# Patient Record
Sex: MALE | Race: WHITE | NOT HISPANIC OR LATINO | Employment: FULL TIME | URBAN - METROPOLITAN AREA
[De-identification: names, ages, dates, MRNs, and addresses within clinical notes are randomized per-mention and may not be internally consistent; named-entity substitution may affect disease eponyms.]

---

## 2018-01-25 ENCOUNTER — OFFICE VISIT (OUTPATIENT)
Dept: UROLOGY | Facility: MEDICAL CENTER | Age: 78
End: 2018-01-25
Payer: MEDICARE

## 2018-01-25 VITALS
WEIGHT: 250 LBS | DIASTOLIC BLOOD PRESSURE: 84 MMHG | HEIGHT: 70 IN | BODY MASS INDEX: 35.79 KG/M2 | SYSTOLIC BLOOD PRESSURE: 142 MMHG

## 2018-01-25 DIAGNOSIS — N52.35 ERECTILE DYSFUNCTION FOLLOWING RADIATION THERAPY: ICD-10-CM

## 2018-01-25 DIAGNOSIS — Z92.3 PERSONAL HISTORY OF IRRADIATION, PRESENTING HAZARDS TO HEALTH: ICD-10-CM

## 2018-01-25 DIAGNOSIS — C61 PROSTATE CANCER (HCC): Primary | ICD-10-CM

## 2018-01-25 DIAGNOSIS — R35.1 NOCTURIA: ICD-10-CM

## 2018-01-25 LAB
PROT UR QL STRIP: NORMAL
SCAN RESULT: 44
SL AMB  POCT GLUCOSE, UA: NORMAL
SL AMB LEUKOCYTE ESTERASE,UA: NORMAL
SL AMB POCT BILIRUBIN,UA: NORMAL
SL AMB POCT BLOOD,UA: NORMAL
SL AMB POCT CLARITY,UA: CLEAR
SL AMB POCT COLOR,UA: NORMAL
SL AMB POCT KETONES,UA: NORMAL
SL AMB POCT NITRITE,UA: NORMAL
SL AMB POCT PH,UA: 5.5
SL AMB POCT SPECIFIC GRAVITY,UA: 1.01
UROBILINOGEN UR STRIP-ACNC: 0.2

## 2018-01-25 PROCEDURE — 81003 URINALYSIS AUTO W/O SCOPE: CPT | Performed by: UROLOGY

## 2018-01-25 PROCEDURE — 51798 US URINE CAPACITY MEASURE: CPT | Performed by: UROLOGY

## 2018-01-25 PROCEDURE — 99214 OFFICE O/P EST MOD 30 MIN: CPT | Performed by: UROLOGY

## 2018-01-25 RX ORDER — EZETIMIBE 10 MG/1
TABLET ORAL
COMMUNITY
Start: 2017-12-21 | End: 2022-03-31

## 2018-01-25 RX ORDER — AMOXICILLIN 500 MG/1
500 CAPSULE ORAL
COMMUNITY
End: 2019-01-31 | Stop reason: ALTCHOICE

## 2018-01-25 RX ORDER — OMEPRAZOLE 10 MG/1
10 CAPSULE, DELAYED RELEASE ORAL
COMMUNITY

## 2018-01-25 RX ORDER — VALSARTAN AND HYDROCHLOROTHIAZIDE 320; 25 MG/1; MG/1
TABLET, FILM COATED ORAL
COMMUNITY
Start: 2017-12-21 | End: 2022-03-31

## 2018-01-25 RX ORDER — ALPRAZOLAM 0.5 MG/1
TABLET ORAL
COMMUNITY
Start: 2017-11-16

## 2018-01-25 RX ORDER — CHLORAL HYDRATE 500 MG
2000 CAPSULE ORAL DAILY
COMMUNITY
End: 2022-04-07 | Stop reason: HOSPADM

## 2018-01-25 RX ORDER — ACETAMINOPHEN 325 MG/1
650 TABLET ORAL
COMMUNITY

## 2018-01-25 RX ORDER — GABAPENTIN 300 MG/1
CAPSULE ORAL
Refills: 0 | COMMUNITY
Start: 2018-01-23 | End: 2019-01-31 | Stop reason: ALTCHOICE

## 2018-01-25 RX ORDER — DIPHENOXYLATE HYDROCHLORIDE AND ATROPINE SULFATE 2.5; .025 MG/1; MG/1
1 TABLET ORAL
COMMUNITY

## 2018-01-25 NOTE — PROGRESS NOTES
Assessment/Plan:        Diagnoses and all orders for this visit:    Prostate cancer ([facility] [address] )  -     POCT urine dip auto non-scope  -     PSA Total, Diagnostic; Future    Erectile dysfunction following radiation therapy    Personal history of irradiation, presenting hazards to health    Nocturia 4-5 times per night    Other orders  -     ALPRAZolam (XANAX) 0 5 mg tablet;   -     amoxicillin (AMOXIL) 500 mg capsule; Take 500 mg by mouth  -     acetaminophen (TYLENOL) 325 mg tablet; Take 650 mg by mouth  -     Calcium Carb-Ergocalciferol 250-125 MG-UNIT TABS; Take 1 tablet by mouth  -     Cholecalciferol 1000 units tablet; Take 1,000 Units by mouth  -     cyanocobalamin 1000 MCG tablet; Take 1,000 mcg by mouth  -     ezetimibe (ZETIA) 10 mg tablet;   -     gabapentin (NEURONTIN) 300 mg capsule; TK 1 C PO QHS FOR 3 DAYS THEN 1 C PO BID FOR 3 DAYS THEN 1 C PO TID  -     multivitamin (THERAGRAN) TABS; Take 1 tablet by mouth  -     omeprazole (PriLOSEC) 10 mg delayed release capsule; Take 10 mg by mouth  -     valsartan-hydrochlorothiazide (DIOVAN-HCT) 320-25 MG per tablet;   -     Omega-3 Fatty Acids (FISH OIL) 1,000 mg; Take 2,000 mg by mouth daily  -     Calcium Polycarbophil (FIBER-CAPS PO); Take 2 tablets by mouth daily        Subjective:     Patient ID: Osmar Dunlap is a 78 y o  male  HPI    Review of Systems   Constitutional: Negative  HENT: Negative  Eyes: Negative  Respiratory: Negative  Cardiovascular: Positive for leg swelling  Gastrointestinal: Negative  Endocrine: Negative  Genitourinary: Positive for urgency  Musculoskeletal: Positive for back pain  Skin: Negative  Allergic/Immunologic: Negative  Neurological: Negative  Hematological: Negative  Psychiatric/Behavioral: Negative  Objective:     Physical Exam   Constitutional: He is oriented to person, place, and time  Vital signs are normal  He appears well-developed and well-nourished  He is cooperative     HENT: Head: Normocephalic and atraumatic  Eyes: EOM are normal  Pupils are equal, round, and reactive to light  Neck: Normal range of motion  Neck supple  Cardiovascular: Normal rate and regular rhythm  Pulmonary/Chest: Effort normal and breath sounds normal    Abdominal: Soft  Bowel sounds are normal    Genitourinary: Penis normal    Musculoskeletal: Normal range of motion  Neurological: He is alert and oriented to person, place, and time  Skin: Skin is warm and dry  Psychiatric: He has a normal mood and affect   His behavior is normal  Judgment and thought content normal

## 2019-01-24 RX ORDER — AMLODIPINE BESYLATE 5 MG/1
TABLET ORAL
Refills: 3 | COMMUNITY
Start: 2018-12-05 | End: 2019-01-31 | Stop reason: ALTCHOICE

## 2019-01-24 RX ORDER — COLCHICINE 0.6 MG/1
TABLET, FILM COATED ORAL
Refills: 1 | COMMUNITY
Start: 2018-12-05 | End: 2021-02-25 | Stop reason: ALTCHOICE

## 2019-01-31 ENCOUNTER — OFFICE VISIT (OUTPATIENT)
Dept: UROLOGY | Facility: MEDICAL CENTER | Age: 79
End: 2019-01-31
Payer: MEDICARE

## 2019-01-31 VITALS
HEIGHT: 69 IN | BODY MASS INDEX: 37.03 KG/M2 | SYSTOLIC BLOOD PRESSURE: 152 MMHG | HEART RATE: 54 BPM | WEIGHT: 250 LBS | DIASTOLIC BLOOD PRESSURE: 86 MMHG

## 2019-01-31 DIAGNOSIS — Z92.3 PERSONAL HISTORY OF IRRADIATION: ICD-10-CM

## 2019-01-31 DIAGNOSIS — R35.1 NOCTURIA MORE THAN TWICE PER NIGHT: ICD-10-CM

## 2019-01-31 DIAGNOSIS — N52.35 ERECTILE DYSFUNCTION FOLLOWING RADIATION THERAPY: ICD-10-CM

## 2019-01-31 DIAGNOSIS — C61 PROSTATE CANCER (HCC): Primary | ICD-10-CM

## 2019-01-31 LAB
POST-VOID RESIDUAL VOLUME, ML POC: 9 ML
SL AMB  POCT GLUCOSE, UA: NORMAL
SL AMB LEUKOCYTE ESTERASE,UA: NORMAL
SL AMB POCT BILIRUBIN,UA: NORMAL
SL AMB POCT BLOOD,UA: NORMAL
SL AMB POCT CLARITY,UA: CLEAR
SL AMB POCT COLOR,UA: YELLOW
SL AMB POCT KETONES,UA: NORMAL
SL AMB POCT NITRITE,UA: NORMAL
SL AMB POCT PH,UA: 6.5
SL AMB POCT SPECIFIC GRAVITY,UA: 1.01
SL AMB POCT URINE PROTEIN: NORMAL
SL AMB POCT UROBILINOGEN: 0.2

## 2019-01-31 PROCEDURE — 99214 OFFICE O/P EST MOD 30 MIN: CPT | Performed by: UROLOGY

## 2019-01-31 PROCEDURE — 51798 US URINE CAPACITY MEASURE: CPT | Performed by: UROLOGY

## 2019-01-31 PROCEDURE — 81003 URINALYSIS AUTO W/O SCOPE: CPT | Performed by: UROLOGY

## 2019-01-31 RX ORDER — OXYBUTYNIN CHLORIDE 5 MG/1
5 TABLET ORAL
Qty: 30 TABLET | Refills: 11 | Status: SHIPPED | OUTPATIENT
Start: 2019-01-31 | End: 2019-05-27 | Stop reason: SDUPTHER

## 2019-01-31 NOTE — PROGRESS NOTES
Assessment/Plan:      Diagnoses and all orders for this visit:    Prostate cancer ([facility] [address] )  -     POCT urine dip auto non-scope  -     PSA Total, Diagnostic; Future  -     PSA Total, Diagnostic; Future    Personal history of irradiation    Erectile dysfunction following radiation therapy    Nocturia more than twice per night  -     POCT Measure PVR  -     oxybutynin (DITROPAN) 5 mg tablet; Take 1 tablet (5 mg total) by mouth daily at bedtime          Subjective:  No complaints     Patient ID: Raad Cummings is a 70 y o  male  HPI  He is 8 years after completion of his external beam radiation therapy for localized prostate cancer  The patient states he voids well except his major urologic issue is that of nocturia 5-6 times per night  He states he has a good urine stream does not have to strain or bear down to generate it  He denies any hematuria, dysuria, flank or abdominal pains  He states he has a good appetite and normal bowel function  He denies any weight loss or pain in new locations  Review of Systems   Constitutional: Negative  HENT: Negative  Eyes: Negative  Respiratory: Negative  Cardiovascular: Negative  Gastrointestinal: Negative  Endocrine: Negative  Genitourinary: Negative  Musculoskeletal: Negative  Skin: Negative  Allergic/Immunologic: Negative  Neurological: Negative  Hematological: Negative  Psychiatric/Behavioral: Negative  Objective:     Physical Exam   Constitutional: He is oriented to person, place, and time  He appears well-developed and well-nourished  No distress  HENT:   Head: Normocephalic and atraumatic  Nose: Nose normal    Mouth/Throat: Oropharynx is clear and moist    Eyes: Pupils are equal, round, and reactive to light  Conjunctivae and EOM are normal  No scleral icterus  Neck: Normal range of motion  Neck supple  Cardiovascular: Normal rate, regular rhythm, normal heart sounds and intact distal pulses      No murmur heard   Pulmonary/Chest: Effort normal and breath sounds normal  No respiratory distress  He has no wheezes  He has no rales  Abdominal: Soft  Bowel sounds are normal  He exhibits no distension and no mass  There is no tenderness  Musculoskeletal: Normal range of motion  He exhibits no edema or tenderness  Lymphadenopathy:     He has no cervical adenopathy  Neurological: He is alert and oriented to person, place, and time  No cranial nerve deficit  Skin: Skin is warm and dry  No rash noted  No erythema  No pallor  Psychiatric: He has a normal mood and affect  His behavior is normal  Judgment and thought content normal    Nursing note and vitals reviewed        PVR today was 9 cc    PSA from 01/26/2018 was 0 2

## 2019-01-31 NOTE — LETTER
January 31, 2019     Sisi Ledbetter MD  [address] [place] [address]  [facility]    Patient: Pretty Sotelo   YOB: 1940   Date of Visit: 1/31/2019       Dear Dr Cassidy Riley: Thank you for referring Mae Marinaers to me for evaluation  Below are my notes for this consultation  If you have questions, please do not hesitate to call me  I look forward to following your patient along with you           Sincerely,        Candelaria Pack MD        CC: No Recipients

## 2019-05-27 DIAGNOSIS — R35.1 NOCTURIA MORE THAN TWICE PER NIGHT: ICD-10-CM

## 2019-05-28 RX ORDER — OXYBUTYNIN CHLORIDE 5 MG/1
TABLET ORAL
Qty: 90 TABLET | Refills: 8 | Status: SHIPPED | OUTPATIENT
Start: 2019-05-28 | End: 2020-01-19

## 2020-01-19 DIAGNOSIS — R35.1 NOCTURIA MORE THAN TWICE PER NIGHT: ICD-10-CM

## 2020-01-19 RX ORDER — OXYBUTYNIN CHLORIDE 5 MG/1
TABLET ORAL
Qty: 30 TABLET | Refills: 0 | Status: SHIPPED | OUTPATIENT
Start: 2020-01-19 | End: 2020-02-13 | Stop reason: SDUPTHER

## 2020-02-13 ENCOUNTER — OFFICE VISIT (OUTPATIENT)
Dept: UROLOGY | Facility: MEDICAL CENTER | Age: 80
End: 2020-02-13
Payer: MEDICARE

## 2020-02-13 VITALS
SYSTOLIC BLOOD PRESSURE: 148 MMHG | HEIGHT: 69 IN | WEIGHT: 265 LBS | BODY MASS INDEX: 39.25 KG/M2 | DIASTOLIC BLOOD PRESSURE: 78 MMHG

## 2020-02-13 DIAGNOSIS — R35.1 NOCTURIA MORE THAN TWICE PER NIGHT: ICD-10-CM

## 2020-02-13 DIAGNOSIS — Z92.3 HISTORY OF EXTERNAL BEAM RADIATION THERAPY: ICD-10-CM

## 2020-02-13 DIAGNOSIS — C61 PROSTATE CANCER (HCC): Primary | ICD-10-CM

## 2020-02-13 LAB
POST-VOID RESIDUAL VOLUME, ML POC: 7 ML
SL AMB  POCT GLUCOSE, UA: NORMAL
SL AMB LEUKOCYTE ESTERASE,UA: NORMAL
SL AMB POCT BILIRUBIN,UA: NORMAL
SL AMB POCT BLOOD,UA: NORMAL
SL AMB POCT CLARITY,UA: CLEAR
SL AMB POCT COLOR,UA: YELLOW
SL AMB POCT KETONES,UA: NORMAL
SL AMB POCT NITRITE,UA: NORMAL
SL AMB POCT PH,UA: 7
SL AMB POCT SPECIFIC GRAVITY,UA: 1.01
SL AMB POCT URINE PROTEIN: NORMAL
SL AMB POCT UROBILINOGEN: 0.2

## 2020-02-13 PROCEDURE — 81003 URINALYSIS AUTO W/O SCOPE: CPT | Performed by: UROLOGY

## 2020-02-13 PROCEDURE — 99214 OFFICE O/P EST MOD 30 MIN: CPT | Performed by: UROLOGY

## 2020-02-13 PROCEDURE — 51798 US URINE CAPACITY MEASURE: CPT | Performed by: UROLOGY

## 2020-02-13 RX ORDER — GABAPENTIN 300 MG/1
CAPSULE ORAL 3 TIMES DAILY
COMMUNITY
End: 2021-02-25 | Stop reason: ALTCHOICE

## 2020-02-13 RX ORDER — AMLODIPINE BESYLATE 5 MG/1
TABLET ORAL
COMMUNITY
Start: 2019-11-18 | End: 2022-03-31

## 2020-02-13 RX ORDER — OXYBUTYNIN CHLORIDE 5 MG/1
10 TABLET ORAL
Qty: 180 TABLET | Refills: 3 | Status: SHIPPED | OUTPATIENT
Start: 2020-02-13 | End: 2021-02-25

## 2020-02-13 NOTE — PROGRESS NOTES
Assessment/Plan:      Diagnoses and all orders for this visit:    Prostate cancer ([facility] [address] )  -     POCT urine dip auto non-scope  -     POCT Measure PVR  -     PSA Total, Diagnostic; Future    History of external beam radiation therapy  -     PSA Total, Diagnostic; Future    Nocturia more than twice per night  -     oxybutynin (DITROPAN) 5 mg tablet; Take 2 tablets (10 mg total) by mouth daily at bedtime    Other orders  -     amLODIPine (NORVASC) 5 mg tablet; TK 1 T PO QD  -     gabapentin (NEURONTIN) 300 mg capsule; 3 (three) times a day  -     metFORMIN (GLUCOPHAGE) 500 mg tablet; Take 500 mg by mouth        Plan: Will have him take 10 mg nightly of Ditropan, to see if that would reduce his nocturia further  Subjective:  No complaints     Patient ID: Princess Koroma is a 70 y o  male  HPI  He is 9 years after completion of his external beam radiation therapy for localized prostate cancer  The patient states he voids well except his major urologic issue is that of nocturia 5-6 times per night when he was last here we started him on Ditropan 5 mg at bedtime  He states that that has had some affect in reducing his nocturia to now 2-3 times per night  He states he has a good urine stream does not have to strain or bear down to generate it  He denies any hematuria, dysuria, flank or abdominal pains  He states he has a good appetite and normal bowel function  He denies any weight loss or pain in new locations  Review of Systems   Constitutional: Negative  HENT: Negative  Eyes: Negative  Respiratory: Negative  Cardiovascular: Negative  Gastrointestinal: Negative  Endocrine: Negative  Genitourinary: Negative  Musculoskeletal: Negative  Skin: Negative  Allergic/Immunologic: Negative  Neurological: Negative  Hematological: Negative  Psychiatric/Behavioral: Negative  Objective:     Physical Exam   Constitutional: He is oriented to person, place, and time   He appears well-developed and well-nourished  No distress  HENT:   Head: Normocephalic and atraumatic  Nose: Nose normal    Mouth/Throat: Oropharynx is clear and moist    Eyes: Pupils are equal, round, and reactive to light  Conjunctivae and EOM are normal  No scleral icterus  Neck: Normal range of motion  Neck supple  Cardiovascular: Normal rate, regular rhythm, normal heart sounds and intact distal pulses  No murmur heard  Pulmonary/Chest: Effort normal and breath sounds normal  No respiratory distress  He has no wheezes  He has no rales  Abdominal: Soft  Bowel sounds are normal  He exhibits no distension and no mass  There is no tenderness  Musculoskeletal: Normal range of motion  He exhibits no edema or tenderness  Lymphadenopathy:     He has no cervical adenopathy  Neurological: He is alert and oriented to person, place, and time  No cranial nerve deficit  Skin: Skin is warm and dry  No rash noted  No erythema  No pallor  Psychiatric: He has a normal mood and affect  His behavior is normal  Judgment and thought content normal    Nursing note and vitals reviewed        Bladder scan PVR today was 7 cc    PSA from 01/31/2020 was 0 15

## 2021-01-05 DIAGNOSIS — C61 PROSTATE CANCER (HCC): Primary | ICD-10-CM

## 2021-01-11 ENCOUNTER — IMMUNIZATIONS (OUTPATIENT)
Dept: FAMILY MEDICINE CLINIC | Facility: HOSPITAL | Age: 81
End: 2021-01-11

## 2021-01-11 DIAGNOSIS — Z23 ENCOUNTER FOR IMMUNIZATION: ICD-10-CM

## 2021-01-11 PROCEDURE — 91301 SARS-COV-2 / COVID-19 MRNA VACCINE (MODERNA) 100 MCG: CPT

## 2021-01-11 PROCEDURE — 0011A SARS-COV-2 / COVID-19 MRNA VACCINE (MODERNA) 100 MCG: CPT

## 2021-02-08 ENCOUNTER — IMMUNIZATIONS (OUTPATIENT)
Dept: FAMILY MEDICINE CLINIC | Facility: HOSPITAL | Age: 81
End: 2021-02-08

## 2021-02-08 DIAGNOSIS — Z23 ENCOUNTER FOR IMMUNIZATION: Primary | ICD-10-CM

## 2021-02-08 PROCEDURE — 0012A SARS-COV-2 / COVID-19 MRNA VACCINE (MODERNA) 100 MCG: CPT

## 2021-02-08 PROCEDURE — 91301 SARS-COV-2 / COVID-19 MRNA VACCINE (MODERNA) 100 MCG: CPT

## 2021-02-25 ENCOUNTER — OFFICE VISIT (OUTPATIENT)
Dept: UROLOGY | Facility: MEDICAL CENTER | Age: 81
End: 2021-02-25
Payer: MEDICARE

## 2021-02-25 VITALS — HEIGHT: 69 IN | WEIGHT: 250 LBS | BODY MASS INDEX: 37.03 KG/M2

## 2021-02-25 DIAGNOSIS — R35.0 URINARY FREQUENCY: ICD-10-CM

## 2021-02-25 DIAGNOSIS — Z85.46 HISTORY OF PROSTATE CANCER: Primary | ICD-10-CM

## 2021-02-25 PROCEDURE — 99214 OFFICE O/P EST MOD 30 MIN: CPT | Performed by: UROLOGY

## 2021-02-25 RX ORDER — CANDESARTAN 32 MG/1
32 TABLET ORAL DAILY
COMMUNITY
Start: 2020-12-07

## 2021-02-25 RX ORDER — ATORVASTATIN CALCIUM 20 MG/1
20 TABLET, FILM COATED ORAL DAILY
COMMUNITY

## 2021-02-25 RX ORDER — HYDROCHLOROTHIAZIDE 25 MG/1
25 TABLET ORAL DAILY
COMMUNITY
Start: 2021-02-15 | End: 2022-03-31

## 2021-02-25 RX ORDER — SOLIFENACIN SUCCINATE 10 MG/1
10 TABLET, FILM COATED ORAL DAILY
Qty: 90 TABLET | Refills: 3 | Status: SHIPPED | OUTPATIENT
Start: 2021-02-25 | End: 2021-04-29

## 2021-02-25 NOTE — PROGRESS NOTES
HISTORY:     follow-up prostate cancer, radiation therapy 10 years ago  Also has chronic urinary frequency, oxybutynin has not helped very much  Nocturia x3 sometimes four  Daytime every 1 5 hours or so  No gross hematuria  ASSESSMENT / PLAN:      1  Last year's PSA was 0 point V, I will get that again now  Options discussed regarding his frequency  We will stop oxybutynin, and start solifenacin 10 mg per day  If that works, continue it and follow-up in one year    The following portions of the patient's history were reviewed and updated as appropriate: allergies, current medications, past family history, past medical history, past social history, past surgical history and problem list     Review of Systems   All other systems reviewed and are negative  Objective:     Physical Exam  Constitutional:       General: He is not in acute distress  Appearance: He is well-developed  He is not diaphoretic  HENT:      Head: Normocephalic and atraumatic  Eyes:      General: No scleral icterus  Pulmonary:      Effort: Pulmonary effort is normal    Genitourinary:     Comments:  Penis testes normal     Prostate minimally enlarged no nodules, hard to feel  Skin:     Coloration: Skin is not pale  Neurological:      Mental Status: He is alert and oriented to person, place, and time  Psychiatric:         Behavior: Behavior normal          Thought Content: Thought content normal          Judgment: Judgment normal            No results found for: PSA]  No results found for: BUN  No results found for: CREATININE  No components found for: CBC      Patient Active Problem List   Diagnosis    History of prostate cancer        Diagnoses and all orders for this visit:    History of prostate cancer  -     PSA Total, Diagnostic; Future    Urinary frequency  -     solifenacin (VESICARE) 10 MG tablet;  Take 1 tablet (10 mg total) by mouth daily    Other orders  -     candesartan (ATACAND) 32 MG tablet; TK 1 T PO QD  -     hydrochlorothiazide (HYDRODIURIL) 25 mg tablet; Take 25 mg by mouth daily  -     atorvastatin (LIPITOR) 20 mg tablet; Take 20 mg by mouth daily           Patient ID: Deanna Pressley is a 68 y o  male        Current Outpatient Medications:     acetaminophen (TYLENOL) 325 mg tablet, Take 650 mg by mouth, Disp: , Rfl:     ALPRAZolam (XANAX) 0 5 mg tablet, , Disp: , Rfl:     amLODIPine (NORVASC) 5 mg tablet, TK 1 T PO QD, Disp: , Rfl:     atorvastatin (LIPITOR) 20 mg tablet, Take 20 mg by mouth daily, Disp: , Rfl:     Calcium Carb-Ergocalciferol 250-125 MG-UNIT TABS, Take 1 tablet by mouth, Disp: , Rfl:     Calcium Polycarbophil (FIBER-CAPS PO), Take 2 tablets by mouth daily, Disp: , Rfl:     candesartan (ATACAND) 32 MG tablet, TK 1 T PO QD, Disp: , Rfl:     Cholecalciferol 1000 units tablet, Take 1,000 Units by mouth, Disp: , Rfl:     cyanocobalamin 1000 MCG tablet, Take 1,000 mcg by mouth, Disp: , Rfl:     hydrochlorothiazide (HYDRODIURIL) 25 mg tablet, Take 25 mg by mouth daily, Disp: , Rfl:     metFORMIN (GLUCOPHAGE) 500 mg tablet, Take 500 mg by mouth, Disp: , Rfl:     multivitamin (THERAGRAN) TABS, Take 1 tablet by mouth, Disp: , Rfl:     Omega-3 Fatty Acids (FISH OIL) 1,000 mg, Take 2,000 mg by mouth daily, Disp: , Rfl:     omeprazole (PriLOSEC) 10 mg delayed release capsule, Take 10 mg by mouth, Disp: , Rfl:     oxybutynin (DITROPAN) 5 mg tablet, Take 2 tablets (10 mg total) by mouth daily at bedtime, Disp: 180 tablet, Rfl: 3    ezetimibe (ZETIA) 10 mg tablet, , Disp: , Rfl:     valsartan-hydrochlorothiazide (DIOVAN-HCT) 320-25 MG per tablet, , Disp: , Rfl:     Past Medical History:   Diagnosis Date    Anxiety state     Depression     Diabetes mellitus, type 2 (HCC)     Erectile dysfunction     Gout     Hemorrhoids, external     Hypertension     Malignant neoplasm prostate ([address] [address] )     Nocturia     Skin cancer     Weak urinary stream        Past Surgical History:   Procedure Laterality Date    HEMORROIDECTOMY  2006    HERNIA REPAIR Bilateral 1977    INSERTION PROSTATE RADIATION SEED  2011    INTRAOPERATIVE RADIATION THERAPY (IORT)  2012    KNEE SURGERY Bilateral 2016    PROSTATE BIOPSY  2011       Social History

## 2021-03-02 ENCOUNTER — TELEPHONE (OUTPATIENT)
Dept: UROLOGY | Facility: MEDICAL CENTER | Age: 81
End: 2021-03-02

## 2021-03-02 NOTE — TELEPHONE ENCOUNTER
Call placed to patient to review his PSA results  There was no answer so message was left with office contact information for patient to return our call  When patient returns our call he can be made aware of note below       ----- Message from Salena Meneses MD sent at 3/2/2021 10:33 AM EST -----  Tell pt:  PSA good

## 2021-03-02 NOTE — TELEPHONE ENCOUNTER
Patient called in advised patient his psa was good form previous note  Patient asked for his numbers   Patient can be reached at [phone]

## 2021-04-29 DIAGNOSIS — R35.0 URINARY FREQUENCY: Primary | ICD-10-CM

## 2021-04-29 NOTE — TELEPHONE ENCOUNTER
----- Message from Grace Cole MA sent at 4/29/2021  8:47 AM EDT -----  Regarding: FW: Prescription Question  Contact: [phone]  Pt was rx'd solifenacin at last visit, states not helping  Was on oxybutynin prior to that, no mention of alternate medication  Please advise   ----- Message -----  From: Jerald Rudolph  Sent: 4/28/2021   4:08 PM EDT  To: [facility]  Subject: Prescription Question                            This new medication that I started after my last visit with Dr Bryn Bowen, does not seem to be any help  The doctor said to try it and if it didn't work we could try something else  Thanks   Claudia Calvo  [birth year]DOB

## 2021-04-29 NOTE — TELEPHONE ENCOUNTER
Call placed to patient and left a detailed message on answering machine per consent form  Informed patient of the medication that was sent to his pharmacy on file  Office number was provided for call back if the patient had any questions or concerns

## 2021-05-07 NOTE — TELEPHONE ENCOUNTER
Representative from [facility], left a message on the Medication Refill voice mail line stating we sent the patient script for Myrbetriq was mistakenly sent to [address] [facility]    Pharmacy information was updated and the script was requeued and forwarded to the Advanced Practitioner covering the [facility] location for approval

## 2022-03-09 ENCOUNTER — APPOINTMENT (OUTPATIENT)
Dept: LAB | Facility: MEDICAL CENTER | Age: 82
End: 2022-03-09
Attending: UROLOGY
Payer: MEDICARE

## 2022-03-09 ENCOUNTER — TELEPHONE (OUTPATIENT)
Dept: OTHER | Facility: OTHER | Age: 82
End: 2022-03-09

## 2022-03-09 DIAGNOSIS — C61 PROSTATE CANCER (HCC): ICD-10-CM

## 2022-03-09 LAB — PSA SERPL-MCNC: 0.2 NG/ML (ref 0–4)

## 2022-03-09 PROCEDURE — 84153 ASSAY OF PSA TOTAL: CPT

## 2022-03-09 NOTE — TELEPHONE ENCOUNTER
Patient called in to inquire about PSA test requested prior to Friday 3/11/22 OV      Triage RN found future order for PSA and patient reports [facility]  If there are any specifics for the test, please follow up with patent

## 2022-03-11 ENCOUNTER — OFFICE VISIT (OUTPATIENT)
Dept: UROLOGY | Facility: MEDICAL CENTER | Age: 82
End: 2022-03-11
Payer: MEDICARE

## 2022-03-11 VITALS
BODY MASS INDEX: 34.07 KG/M2 | HEIGHT: 69 IN | HEART RATE: 62 BPM | SYSTOLIC BLOOD PRESSURE: 120 MMHG | WEIGHT: 230 LBS | DIASTOLIC BLOOD PRESSURE: 60 MMHG

## 2022-03-11 DIAGNOSIS — N39.41 URGE INCONTINENCE: ICD-10-CM

## 2022-03-11 DIAGNOSIS — Z85.46 HISTORY OF PROSTATE CANCER: ICD-10-CM

## 2022-03-11 DIAGNOSIS — R35.1 NOCTURIA: Primary | ICD-10-CM

## 2022-03-11 PROCEDURE — 99214 OFFICE O/P EST MOD 30 MIN: CPT | Performed by: UROLOGY

## 2022-03-11 RX ORDER — GABAPENTIN 300 MG/1
300 CAPSULE ORAL 3 TIMES DAILY
COMMUNITY
Start: 2022-03-02

## 2022-03-11 RX ORDER — SPIRONOLACTONE 25 MG/1
25 TABLET ORAL DAILY
COMMUNITY
Start: 2022-01-09

## 2022-03-11 RX ORDER — TOLTERODINE 4 MG/1
4 CAPSULE, EXTENDED RELEASE ORAL
Qty: 30 CAPSULE | Refills: 2 | Status: SHIPPED | OUTPATIENT
Start: 2022-03-11 | End: 2022-06-07

## 2022-03-11 NOTE — PROGRESS NOTES
HISTORY:    History of radiation therapy around 2000 liver prostate cancer, PSA consistently low since then  0 2 recently    2  Refractory overactive bladder, nocturia times 6-8, daytime every one to 3 hours , occasional leakage he does not get there right away    No help VESIcare, oxybutynin, and Myrbetriq  ASSESSMENT / PLAN:    1  Options discussed  PTNS and Botox have all been discussed with overall 75% success rate  2  He is not interested in procedures now, has lots of other medical issues going on   3  He wants to try another medicine, tolterodine 4 mg sent to pharmacy  If after six weeks or so it is helping, he will call for refills, if not we will reassess    The following portions of the patient's history were reviewed and updated as appropriate: allergies, current medications, past family history, past medical history, past social history, past surgical history and problem list     Review of Systems   All other systems reviewed and are negative  Objective:     Physical Exam  Constitutional:       General: He is not in acute distress  Appearance: Normal appearance  He is well-developed  He is obese  He is not diaphoretic  HENT:      Head: Normocephalic and atraumatic  Eyes:      General: No scleral icterus  Pulmonary:      Effort: Pulmonary effort is normal    Skin:     Coloration: Skin is not pale  Neurological:      Mental Status: He is alert and oriented to person, place, and time  Psychiatric:         Behavior: Behavior normal          Thought Content:  Thought content normal          Judgment: Judgment normal            0   Lab Value Date/Time    PSA 0 2 03/09/2022 1533   ]  No results found for: BUN  No results found for: CREATININE  No components found for: CBC      Patient Active Problem List   Diagnosis    History of prostate cancer    Urinary frequency        Diagnoses and all orders for this visit:    Nocturia  -     tolterodine (DETROL LA) 4 mg 24 hr capsule; Take 1 capsule (4 mg total) by mouth daily at bedtime    Urge incontinence  -     tolterodine (DETROL LA) 4 mg 24 hr capsule; Take 1 capsule (4 mg total) by mouth daily at bedtime    History of prostate cancer    Other orders  -     gabapentin (NEURONTIN) 300 mg capsule; Take 300 mg by mouth 3 (three) times a day  -     spironolactone (ALDACTONE) 25 mg tablet; Take 25 mg by mouth daily           Patient ID: Karla Floyd is a 80 y o  male        Current Outpatient Medications:     acetaminophen (TYLENOL) 325 mg tablet, Take 650 mg by mouth, Disp: , Rfl:     ALPRAZolam (XANAX) 0 5 mg tablet, , Disp: , Rfl:     atorvastatin (LIPITOR) 20 mg tablet, Take 20 mg by mouth daily, Disp: , Rfl:     Calcium Carb-Ergocalciferol 250-125 MG-UNIT TABS, Take 1 tablet by mouth, Disp: , Rfl:     candesartan (ATACAND) 32 MG tablet, 32 mg daily  , Disp: , Rfl:     Cholecalciferol 1000 units tablet, Take 1,000 Units by mouth, Disp: , Rfl:     cyanocobalamin 1000 MCG tablet, Take 1,000 mcg by mouth, Disp: , Rfl:     gabapentin (NEURONTIN) 300 mg capsule, Take 300 mg by mouth 3 (three) times a day, Disp: , Rfl:     multivitamin (THERAGRAN) TABS, Take 1 tablet by mouth, Disp: , Rfl:     Omega-3 Fatty Acids (FISH OIL) 1,000 mg, Take 2,000 mg by mouth daily, Disp: , Rfl:     omeprazole (PriLOSEC) 10 mg delayed release capsule, Take 10 mg by mouth, Disp: , Rfl:     spironolactone (ALDACTONE) 25 mg tablet, Take 25 mg by mouth daily, Disp: , Rfl:     amLODIPine (NORVASC) 5 mg tablet, TK 1 T PO QD (Patient not taking: Reported on 3/11/2022), Disp: , Rfl:     Calcium Polycarbophil (FIBER-CAPS PO), Take 2 tablets by mouth daily (Patient not taking: Reported on 3/11/2022 ), Disp: , Rfl:     ezetimibe (ZETIA) 10 mg tablet, , Disp: , Rfl:     hydrochlorothiazide (HYDRODIURIL) 25 mg tablet, Take 25 mg by mouth daily (Patient not taking: Reported on 3/11/2022 ), Disp: , Rfl:     tolterodine (DETROL LA) 4 mg 24 hr capsule, Take 1 capsule (4 mg total) by mouth daily at bedtime, Disp: 30 capsule, Rfl: 2    valsartan-hydrochlorothiazide (DIOVAN-HCT) 320-25 MG per tablet, , Disp: , Rfl:     Past Medical History:   Diagnosis Date    Anxiety state     Depression     Diabetes mellitus, type 2 ([facility] )     Erectile dysfunction     Gout     Hemorrhoids, external     Hypertension     Malignant neoplasm prostate ([address] )     Nocturia     Skin cancer     Weak urinary stream        Past Surgical History:   Procedure Laterality Date    HEMORROIDECTOMY  2006    HERNIA REPAIR Bilateral 1977    INSERTION PROSTATE RADIATION SEED  2011    INTRAOPERATIVE RADIATION THERAPY ([facility])  2012    KNEE SURGERY Bilateral 2016    PROSTATE BIOPSY  2011       Social History

## 2022-03-23 ENCOUNTER — APPOINTMENT (OUTPATIENT)
Dept: LAB | Facility: MEDICAL CENTER | Age: 82
End: 2022-03-23
Payer: MEDICARE

## 2022-03-23 ENCOUNTER — CLINICAL SUPPORT (OUTPATIENT)
Dept: URGENT CARE | Facility: MEDICAL CENTER | Age: 82
End: 2022-03-23
Payer: MEDICARE

## 2022-03-23 DIAGNOSIS — Z01.812 PRE-OPERATIVE LABORATORY EXAMINATION: ICD-10-CM

## 2022-03-23 DIAGNOSIS — D48.5 NEOPLASM OF UNCERTAIN BEHAVIOR OF SKIN: ICD-10-CM

## 2022-03-23 DIAGNOSIS — Z01.818 OTHER SPECIFIED PRE-OPERATIVE EXAMINATION: ICD-10-CM

## 2022-03-23 LAB
ANION GAP SERPL CALCULATED.3IONS-SCNC: 4 MMOL/L (ref 4–13)
ATRIAL RATE: 55 BPM
BUN SERPL-MCNC: 25 MG/DL (ref 5–25)
CALCIUM SERPL-MCNC: 9.6 MG/DL (ref 8.3–10.1)
CHLORIDE SERPL-SCNC: 101 MMOL/L (ref 100–108)
CO2 SERPL-SCNC: 26 MMOL/L (ref 21–32)
CREAT SERPL-MCNC: 1.07 MG/DL (ref 0.6–1.3)
ERYTHROCYTE [DISTWIDTH] IN BLOOD BY AUTOMATED COUNT: 15.6 % (ref 11.6–15.1)
GFR SERPL CREATININE-BSD FRML MDRD: 65 ML/MIN/1.73SQ M
GLUCOSE SERPL-MCNC: 179 MG/DL (ref 65–140)
HCT VFR BLD AUTO: 34.3 % (ref 36.5–49.3)
HGB BLD-MCNC: 11.7 G/DL (ref 12–17)
MCH RBC QN AUTO: 33.2 PG (ref 26.8–34.3)
MCHC RBC AUTO-ENTMCNC: 34.1 G/DL (ref 31.4–37.4)
MCV RBC AUTO: 97 FL (ref 82–98)
P AXIS: 44 DEGREES
PLATELET # BLD AUTO: 145 THOUSANDS/UL (ref 149–390)
PMV BLD AUTO: 12.6 FL (ref 8.9–12.7)
POTASSIUM SERPL-SCNC: 5.6 MMOL/L (ref 3.5–5.3)
PR INTERVAL: 170 MS
QRS AXIS: 14 DEGREES
QRSD INTERVAL: 94 MS
QT INTERVAL: 398 MS
QTC INTERVAL: 380 MS
RBC # BLD AUTO: 3.52 MILLION/UL (ref 3.88–5.62)
SODIUM SERPL-SCNC: 131 MMOL/L (ref 136–145)
T WAVE AXIS: 20 DEGREES
VENTRICULAR RATE: 55 BPM
WBC # BLD AUTO: 9.67 THOUSAND/UL (ref 4.31–10.16)

## 2022-03-23 PROCEDURE — 80048 BASIC METABOLIC PNL TOTAL CA: CPT

## 2022-03-23 PROCEDURE — 36415 COLL VENOUS BLD VENIPUNCTURE: CPT

## 2022-03-23 PROCEDURE — 93005 ELECTROCARDIOGRAM TRACING: CPT

## 2022-03-23 PROCEDURE — 85027 COMPLETE CBC AUTOMATED: CPT

## 2022-03-23 PROCEDURE — 93010 ELECTROCARDIOGRAM REPORT: CPT

## 2022-03-31 ENCOUNTER — ANESTHESIA EVENT (OUTPATIENT)
Dept: PERIOP | Facility: HOSPITAL | Age: 82
End: 2022-03-31
Payer: MEDICARE

## 2022-03-31 NOTE — PRE-PROCEDURE INSTRUCTIONS
Pre-Surgery Instructions:   Medication Instructions    acetaminophen (TYLENOL) 325 mg tablet Instructed patient per Anesthesia Guidelines   ALPRAZolam (XANAX) 0 5 mg tablet Instructed patient per Anesthesia Guidelines   atorvastatin (LIPITOR) 20 mg tablet Instructed patient per Anesthesia Guidelines   Calcium Carb-Ergocalciferol 250-125 MG-UNIT TABS Instructed patient per Anesthesia Guidelines   candesartan (ATACAND) 32 MG tablet Instructed patient per Anesthesia Guidelines   Cholecalciferol 1000 units tablet Instructed patient per Anesthesia Guidelines   cyanocobalamin 1000 MCG tablet Instructed patient per Anesthesia Guidelines   gabapentin (NEURONTIN) 300 mg capsule Instructed patient per Anesthesia Guidelines   multivitamin (THERAGRAN) TABS Instructed patient per Anesthesia Guidelines   Omega-3 Fatty Acids (FISH OIL) 1,000 mg Instructed patient per Anesthesia Guidelines   omeprazole (PriLOSEC) 10 mg delayed release capsule Instructed patient per Anesthesia Guidelines   spironolactone (ALDACTONE) 25 mg tablet Instructed patient per Anesthesia Guidelines   tolterodine (DETROL LA) 4 mg 24 hr capsule Instructed patient per Anesthesia Guidelines  Spoke with patient medication list reviewed  Pt told to stop all vitamins and nsaids/ fish oil 1 week prior to surgery  Npo after midnight may take xanax and prilosec DOS with sm sip of water if needed  No candesartan or spironolactone DOS  No alcohol 24hrs prior to surgery  Pt vaccinated denies covid symptoms  Pt will shower not to put surgical soap on face  Not to apply powder creams deodorants after shower  No shaving 24hrs prior to surgery  Bring photo id and isnurance card  No contacts or jewelry  1 vaccinated adult allowed with pt  Pt must have ride home after surgery  [facility] will call 4/6 with arrival time and directions

## 2022-04-04 ENCOUNTER — APPOINTMENT (OUTPATIENT)
Dept: LAB | Facility: MEDICAL CENTER | Age: 82
End: 2022-04-04
Payer: MEDICARE

## 2022-04-04 DIAGNOSIS — E87.5 HYPERKALEMIA: ICD-10-CM

## 2022-04-04 LAB
ANION GAP SERPL CALCULATED.3IONS-SCNC: 4 MMOL/L (ref 4–13)
BUN SERPL-MCNC: 25 MG/DL (ref 5–25)
CALCIUM SERPL-MCNC: 9.4 MG/DL (ref 8.3–10.1)
CHLORIDE SERPL-SCNC: 103 MMOL/L (ref 100–108)
CO2 SERPL-SCNC: 29 MMOL/L (ref 21–32)
CREAT SERPL-MCNC: 1.08 MG/DL (ref 0.6–1.3)
GFR SERPL CREATININE-BSD FRML MDRD: 64 ML/MIN/1.73SQ M
GLUCOSE P FAST SERPL-MCNC: 128 MG/DL (ref 65–99)
POTASSIUM SERPL-SCNC: 4.6 MMOL/L (ref 3.5–5.3)
SODIUM SERPL-SCNC: 136 MMOL/L (ref 136–145)

## 2022-04-04 PROCEDURE — 80048 BASIC METABOLIC PNL TOTAL CA: CPT

## 2022-04-04 PROCEDURE — 36415 COLL VENOUS BLD VENIPUNCTURE: CPT

## 2022-04-07 ENCOUNTER — ANESTHESIA (OUTPATIENT)
Dept: PERIOP | Facility: HOSPITAL | Age: 82
End: 2022-04-07
Payer: MEDICARE

## 2022-04-07 ENCOUNTER — HOSPITAL ENCOUNTER (OUTPATIENT)
Facility: HOSPITAL | Age: 82
Setting detail: OUTPATIENT SURGERY
Discharge: HOME | End: 2022-04-07
Attending: PLASTIC SURGERY | Admitting: PLASTIC SURGERY
Payer: MEDICARE

## 2022-04-07 VITALS
HEIGHT: 69 IN | RESPIRATION RATE: 16 BRPM | TEMPERATURE: 96.9 F | OXYGEN SATURATION: 96 % | SYSTOLIC BLOOD PRESSURE: 164 MMHG | WEIGHT: 242.29 LBS | DIASTOLIC BLOOD PRESSURE: 69 MMHG | BODY MASS INDEX: 35.89 KG/M2 | HEART RATE: 83 BPM

## 2022-04-07 DIAGNOSIS — E87.5 HYPERKALEMIA: Primary | ICD-10-CM

## 2022-04-07 PROBLEM — N42.9 PROSTATIC DISORDER: Status: ACTIVE | Noted: 2022-04-07

## 2022-04-07 PROBLEM — T88.4XXA DIFFICULT INTUBATION: Status: ACTIVE | Noted: 2022-04-07

## 2022-04-07 RX ORDER — HYDROCODONE BITARTRATE AND ACETAMINOPHEN 5; 325 MG/1; MG/1
2 TABLET ORAL EVERY 4 HOURS PRN
Status: DISCONTINUED | OUTPATIENT
Start: 2022-04-07 | End: 2022-04-07 | Stop reason: HOSPADM

## 2022-04-07 RX ORDER — MAGNESIUM HYDROXIDE 1200 MG/15ML
LIQUID ORAL AS NEEDED
Status: DISCONTINUED | OUTPATIENT
Start: 2022-04-07 | End: 2022-04-07 | Stop reason: HOSPADM

## 2022-04-07 RX ORDER — SODIUM CHLORIDE, SODIUM LACTATE, POTASSIUM CHLORIDE, CALCIUM CHLORIDE 600; 310; 30; 20 MG/100ML; MG/100ML; MG/100ML; MG/100ML
125 INJECTION, SOLUTION INTRAVENOUS CONTINUOUS
Status: DISCONTINUED | OUTPATIENT
Start: 2022-04-07 | End: 2022-04-07 | Stop reason: HOSPADM

## 2022-04-07 RX ORDER — ALBUTEROL SULFATE 2.5 MG/3ML
2.5 SOLUTION RESPIRATORY (INHALATION) ONCE AS NEEDED
Status: DISCONTINUED | OUTPATIENT
Start: 2022-04-07 | End: 2022-04-07 | Stop reason: HOSPADM

## 2022-04-07 RX ORDER — ONDANSETRON 2 MG/ML
4 INJECTION INTRAMUSCULAR; INTRAVENOUS ONCE AS NEEDED
Status: DISCONTINUED | OUTPATIENT
Start: 2022-04-07 | End: 2022-04-07 | Stop reason: HOSPADM

## 2022-04-07 RX ORDER — FENTANYL CITRATE 50 UG/ML
INJECTION, SOLUTION INTRAMUSCULAR; INTRAVENOUS AS NEEDED
Status: DISCONTINUED | OUTPATIENT
Start: 2022-04-07 | End: 2022-04-07

## 2022-04-07 RX ORDER — FENTANYL CITRATE/PF 50 MCG/ML
25 SYRINGE (ML) INJECTION
Status: DISCONTINUED | OUTPATIENT
Start: 2022-04-07 | End: 2022-04-07 | Stop reason: HOSPADM

## 2022-04-07 RX ORDER — ROCURONIUM BROMIDE 10 MG/ML
INJECTION, SOLUTION INTRAVENOUS AS NEEDED
Status: DISCONTINUED | OUTPATIENT
Start: 2022-04-07 | End: 2022-04-07

## 2022-04-07 RX ORDER — GLYCOPYRROLATE 0.2 MG/ML
INJECTION INTRAMUSCULAR; INTRAVENOUS AS NEEDED
Status: DISCONTINUED | OUTPATIENT
Start: 2022-04-07 | End: 2022-04-07

## 2022-04-07 RX ORDER — LIDOCAINE HYDROCHLORIDE AND EPINEPHRINE 10; 10 MG/ML; UG/ML
INJECTION, SOLUTION INFILTRATION; PERINEURAL AS NEEDED
Status: DISCONTINUED | OUTPATIENT
Start: 2022-04-07 | End: 2022-04-07 | Stop reason: HOSPADM

## 2022-04-07 RX ORDER — MIDAZOLAM HYDROCHLORIDE 2 MG/2ML
INJECTION, SOLUTION INTRAMUSCULAR; INTRAVENOUS AS NEEDED
Status: DISCONTINUED | OUTPATIENT
Start: 2022-04-07 | End: 2022-04-07

## 2022-04-07 RX ORDER — DEXAMETHASONE SODIUM PHOSPHATE 10 MG/ML
INJECTION, SOLUTION INTRAMUSCULAR; INTRAVENOUS AS NEEDED
Status: DISCONTINUED | OUTPATIENT
Start: 2022-04-07 | End: 2022-04-07

## 2022-04-07 RX ORDER — CEFAZOLIN SODIUM 2 G/50ML
2000 SOLUTION INTRAVENOUS ONCE
Status: COMPLETED | OUTPATIENT
Start: 2022-04-07 | End: 2022-04-07

## 2022-04-07 RX ORDER — PROPOFOL 10 MG/ML
INJECTION, EMULSION INTRAVENOUS AS NEEDED
Status: DISCONTINUED | OUTPATIENT
Start: 2022-04-07 | End: 2022-04-07

## 2022-04-07 RX ORDER — LIDOCAINE HYDROCHLORIDE 20 MG/ML
INJECTION, SOLUTION EPIDURAL; INFILTRATION; INTRACAUDAL; PERINEURAL AS NEEDED
Status: DISCONTINUED | OUTPATIENT
Start: 2022-04-07 | End: 2022-04-07

## 2022-04-07 RX ORDER — PROMETHAZINE HYDROCHLORIDE 25 MG/ML
12.5 INJECTION, SOLUTION INTRAMUSCULAR; INTRAVENOUS ONCE AS NEEDED
Status: DISCONTINUED | OUTPATIENT
Start: 2022-04-07 | End: 2022-04-07 | Stop reason: HOSPADM

## 2022-04-07 RX ORDER — HYDROMORPHONE HCL/PF 1 MG/ML
0.5 SYRINGE (ML) INJECTION
Status: DISCONTINUED | OUTPATIENT
Start: 2022-04-07 | End: 2022-04-07 | Stop reason: HOSPADM

## 2022-04-07 RX ADMIN — GLYCOPYRROLATE 0.2 MG: 0.2 INJECTION, SOLUTION INTRAMUSCULAR; INTRAVENOUS at 16:52

## 2022-04-07 RX ADMIN — CEFAZOLIN SODIUM 2000 MG: 2 SOLUTION INTRAVENOUS at 15:59

## 2022-04-07 RX ADMIN — MIDAZOLAM 2 MG: 1 INJECTION INTRAMUSCULAR; INTRAVENOUS at 16:02

## 2022-04-07 RX ADMIN — SODIUM CHLORIDE, SODIUM LACTATE, POTASSIUM CHLORIDE, AND CALCIUM CHLORIDE: .6; .31; .03; .02 INJECTION, SOLUTION INTRAVENOUS at 16:59

## 2022-04-07 RX ADMIN — PROPOFOL 200 MG: 10 INJECTION, EMULSION INTRAVENOUS at 16:11

## 2022-04-07 RX ADMIN — SUGAMMADEX 400 MG: 100 INJECTION, SOLUTION INTRAVENOUS at 17:01

## 2022-04-07 RX ADMIN — LIDOCAINE HYDROCHLORIDE 100 MG: 20 INJECTION, SOLUTION EPIDURAL; INFILTRATION; INTRACAUDAL; PERINEURAL at 16:11

## 2022-04-07 RX ADMIN — SODIUM CHLORIDE, SODIUM LACTATE, POTASSIUM CHLORIDE, AND CALCIUM CHLORIDE 125 ML/HR: .6; .31; .03; .02 INJECTION, SOLUTION INTRAVENOUS at 13:25

## 2022-04-07 RX ADMIN — DEXAMETHASONE SODIUM PHOSPHATE 5 MG: 10 INJECTION INTRAMUSCULAR; INTRAVENOUS at 16:30

## 2022-04-07 RX ADMIN — FENTANYL CITRATE 50 MCG: 50 INJECTION INTRAMUSCULAR; INTRAVENOUS at 16:18

## 2022-04-07 RX ADMIN — FENTANYL CITRATE 50 MCG: 50 INJECTION INTRAMUSCULAR; INTRAVENOUS at 16:11

## 2022-04-07 RX ADMIN — ROCURONIUM BROMIDE 40 MG: 50 INJECTION, SOLUTION INTRAVENOUS at 16:18

## 2022-04-07 NOTE — DISCHARGE INSTRUCTIONS
[address], [facility], [address], [facility], [address] Rachell Galeazzi /K / [email or web address]  [facility]         No ice or heating pack    Keep your dressing on    Keep your dressing dry    Keep your head elevated as much as possible        Call [phone] on monday for an appointment in 6-12 days

## 2022-04-07 NOTE — ANESTHESIA PREPROCEDURE EVALUATION
Procedure:  NASAL WOUND RECONSTRUCTION MOHS DEFECT; LOCAL FLAP (N/A Nose)    Relevant Problems   ANESTHESIA  intubated with glidescope in past    (+) Difficult intubation      ENDO  BMI   35       /RENAL   (+) Prostatic disorder      HEMATOLOGY (within normal limits)      MUSCULOSKELETAL (within normal limits)      NEURO/PSYCH   (+) History of prostate cancer      PULMONARY (within normal limits)      Other   (+) Urinary frequency        Physical Exam    Airway    Mallampati score: III  TM Distance: >3 FB  Neck ROM: full     Dental   No notable dental hx     Cardiovascular  Rhythm: regular, Rate: normal, Cardiovascular exam normal    Pulmonary  Pulmonary exam normal Breath sounds clear to auscultation,     Other Findings        Anesthesia Plan  ASA Score- 2     Anesthesia Type- IV sedation with anesthesia with ASA Monitors  Additional Monitors:   Airway Plan:     Comment: TIVA   Plan Factors-    Chart reviewed  Existing labs reviewed  Patient summary reviewed  Induction- intravenous  Postoperative Plan-     Informed Consent- Anesthetic plan and risks discussed with patient

## 2022-04-07 NOTE — ANESTHESIA POSTPROCEDURE EVALUATION
Post-Op Assessment Note    CV Status:  Stable  Pain Score: 1    Pain management: adequate     Mental Status:  Alert and awake   Hydration Status:  Euvolemic   PONV Controlled:  Controlled   Airway Patency:  Patent      Post Op Vitals Reviewed: Yes      Staff: Anesthesiologist         No complications documented      BP      Temp      Pulse     Resp      SpO2      /67   Pulse 73   Temp 98 2 °F (36 8 °C)   Resp 16   Ht 5' 9" (1 753 m)   Wt 110 kg (242 lb 4 6 oz)   SpO2 96%   BMI 35 78 kg/m²

## 2022-04-07 NOTE — DISCHARGE SUMMARY
Discharge Summary - Medical Rosalie Green 80 y o  male MRN: [id number]    [facility]  Room / Bed: OR POOL/OR POOL Encounter: [id number]    BRIEF OVERVIEW  Admitting Provider: Marcus Roche MD  Discharge Provider: Marcus Roche MD  Primary Care Physician at Discharge: Lester Kayser, [phone]    Discharge To: Home      Admission Date: 4/7/2022     Discharge Date: No discharge date for patient encounter  Code Status: No Order  Advance Directive and Living Will: <no information>  Power of :        Primary Discharge Diagnosis  Active Problems:    Prostatic disorder    Difficult intubation  Resolved Problems:    * No resolved hospital problems   *        Discharge Disposition: Final discharge disposition not confirmed            [address] [place] [address]    Presenting Problem/History of Present Illness  <principal problem not specified>      Discharge Condition: stable    Patient tolerated the procedure well, recovered and was discharged home in stable condition    Marcus Roche MD  4/7/2022  4:09 PM

## 2022-04-08 NOTE — OP NOTE
OPERATIVE REPORT  PATIENT NAME: Jesica Acevedo    :  1940  MRN: [id number]  Pt Location: [facility] OR ROOM 04    SURGERY DATE: 2022    Surgeon(s) and Role:     * Johanna Augustin MD - Primary     * Fernando Higgins - Assisting    Preop Diagnosis:  Unspecified open wound of nose, initial encounter [S01 20XA]    Post-Op Diagnosis Codes:     * Unspecified open wound of nose, initial encounter [S01 20XA]    Procedure(s) (LRB):  NASAL WOUND RECONSTRUCTION MOHS DEFECT; LOCAL FLAP (N/A) and Full thickness skin graft    Specimen(s):  * No specimens in log *    Estimated Blood Loss:   Minimal    Drains:  * No LDAs found *    Anesthesia Type:   IV Sedation with Anesthesia    Operative Indications:  Unspecified open wound of nose, initial encounter [S01 20XA]      Operative Findings:      Complications:   None    Procedure and Technique:  The patient was brought to the operating room and placed supine on the operating room table  Time-out procedure performed SCDs were applied and IV antibiotics were given  After adequate anesthesia was obtained the face and nose were prepped and draped using standard surgical technique  The nasal wound was copiously irrigated and excellent hemostasis was achieved there was a well vascularized wound bed  A local flap was designed in order to minimize the amount of skin graft necessary  An incision was carried superiorly and laterally  The lateral skin was elevated in a subcutaneous plane was rotated and advanced inferiorly and medially for flap plus defect of 4 centimeters squared  The flap was inset using 4-0 Vicryl suture in interrupted deep dermal technique followed by 5 0 fast-absorbing suture in interrupted technique  The residual nasal defect was 1 5 cm x 1 3 cm  A template of the defect was transposed onto the left neck area  This was incorporated into an ellipse and elevated as a full-thickness skin graft    The neck wound was closed using 4-0 Vicryl suture in interrupted deep dermal technique followed by 4-0 Stratafix suture in running subcuticular technique  The wound was cleaned and dried and skin glue was applied  The skin graft was cut to the shape of the defect and all hair follicles were removed from the skin graft  This was then inset into the defect using 3-0 nylon suture in interrupted technique as well as 5 0 fast-absorbing suture in interrupted technique  This was then covered with a Xeroform and mineral oil moistened cotton balls  The nylons were tied over the cotton balls as a bolster which provided excellent stability of the dressing       I was present for the entire procedure and A qualified resident physician was not available    Patient Disposition:  hemodynamically stable and extubated and stable      SIGNATURE: Lamar Chowdhury MD  DATE: 4/7/22  TIME: 5pm

## 2022-06-04 DIAGNOSIS — N39.41 URGE INCONTINENCE: ICD-10-CM

## 2022-06-04 DIAGNOSIS — R35.1 NOCTURIA: ICD-10-CM

## 2022-06-07 RX ORDER — TOLTERODINE 4 MG/1
4 CAPSULE, EXTENDED RELEASE ORAL
Qty: 30 CAPSULE | Refills: 2 | Status: SHIPPED | OUTPATIENT
Start: 2022-06-07 | End: 2022-07-20 | Stop reason: ALTCHOICE

## 2022-10-25 ENCOUNTER — OFFICE VISIT (OUTPATIENT)
Dept: UROLOGY | Facility: MEDICAL CENTER | Age: 82
End: 2022-10-25
Payer: MEDICARE

## 2022-10-25 VITALS
SYSTOLIC BLOOD PRESSURE: 132 MMHG | BODY MASS INDEX: 36.43 KG/M2 | HEIGHT: 69 IN | HEART RATE: 66 BPM | OXYGEN SATURATION: 96 % | WEIGHT: 246 LBS | DIASTOLIC BLOOD PRESSURE: 78 MMHG

## 2022-10-25 DIAGNOSIS — R35.0 URINARY FREQUENCY: ICD-10-CM

## 2022-10-25 DIAGNOSIS — Z85.46 HISTORY OF PROSTATE CANCER: Primary | ICD-10-CM

## 2022-10-25 PROCEDURE — 99214 OFFICE O/P EST MOD 30 MIN: CPT | Performed by: UROLOGY

## 2022-10-25 NOTE — PROGRESS NOTES
HISTORY:    1  Overactive bladder, on trospium twice per day which he thinks works okay  Prior meds were oxybutynin, VESIcare, Myrbetriq    nocturia times 3-4, daytime every 2-3 hours  Only occasional urgency incontinence of a few drops    2  Radiation therapy many years ago for prostate cancer, PSA consistently low ever since         ASSESSMENT / PLAN:    1  Because this is his fourth overactive bladder med, I will continue it and not try different one  2  Continue to get yearly PSA    3  follow-up one year      The following portions of the patient's history were reviewed and updated as appropriate: allergies, current medications, past family history, past medical history, past social history, past surgical history and problem list     Review of Systems   All other systems reviewed and are negative  Objective:     Physical Exam  Constitutional:       General: He is not in acute distress  Appearance: He is well-developed  He is not diaphoretic  HENT:      Head: Normocephalic and atraumatic  Eyes:      General: No scleral icterus  Pulmonary:      Effort: Pulmonary effort is normal    Genitourinary:     Comments: Penis testes normal  Skin:     Coloration: Skin is not pale  Neurological:      Mental Status: He is alert and oriented to person, place, and time  Psychiatric:         Behavior: Behavior normal          Thought Content:  Thought content normal          Judgment: Judgment normal            0   Lab Value Date/Time    PSA 0 2 03/09/2022 1533   ]  BUN   Date Value Ref Range Status   04/04/2022 25 5 - 25 mg/dL Final     Creatinine   Date Value Ref Range Status   04/04/2022 1 08 0 60 - 1 30 mg/dL Final     Comment:     Standardized to IDMS reference method     No components found for: CBC      Patient Active Problem List   Diagnosis   • History of prostate cancer   • Urinary frequency   • Prostatic disorder   • Difficult intubation        Diagnoses and all orders for this visit: History of prostate cancer    Urinary frequency           Patient ID: José Mohr is a [de-identified] y o  male        Current Outpatient Medications:   •  acetaminophen (TYLENOL) 325 mg tablet, Take 650 mg by mouth, Disp: , Rfl:   •  ALPRAZolam (XANAX) 0 5 mg tablet, , Disp: , Rfl:   •  atorvastatin (LIPITOR) 20 mg tablet, Take 20 mg by mouth daily, Disp: , Rfl:   •  Calcium Carb-Ergocalciferol 250-125 MG-UNIT TABS, Take 1 tablet by mouth, Disp: , Rfl:   •  Calcium Polycarbophil (FIBER-CAPS PO), Take 2 tablets by mouth daily, Disp: , Rfl:   •  candesartan (ATACAND) 32 MG tablet, 32 mg daily  , Disp: , Rfl:   •  Cholecalciferol 1000 units tablet, Take 1,000 Units by mouth, Disp: , Rfl:   •  cyanocobalamin 1000 MCG tablet, Take 1,000 mcg by mouth, Disp: , Rfl:   •  gabapentin (NEURONTIN) 300 mg capsule, Take 300 mg by mouth 3 (three) times a day, Disp: , Rfl:   •  multivitamin (THERAGRAN) TABS, Take 1 tablet by mouth, Disp: , Rfl:   •  omeprazole (PriLOSEC) 10 mg delayed release capsule, Take 10 mg by mouth, Disp: , Rfl:   •  spironolactone (ALDACTONE) 25 mg tablet, Take 25 mg by mouth daily, Disp: , Rfl:   •  trospium chloride (SANCTURA) 20 mg tablet, Take 1 tablet (20 mg total) by mouth 2 (two) times a day, Disp: 60 tablet, Rfl: 3    Past Medical History:   Diagnosis Date   • Anxiety state    • Depression    • Diabetes mellitus, type 2 ([address] )    • Erectile dysfunction    • Gout    • Hard to intubate    • Hemorrhoids, external    • Hypertension    • Malignant neoplasm prostate ([address] )    • Nocturia    • Skin cancer    • Weak urinary stream        Past Surgical History:   Procedure Laterality Date   • COLONOSCOPY     • HEMORROIDECTOMY  2006   • HERNIA REPAIR Bilateral 1977   • INSERTION PROSTATE RADIATION SEED  2011   • INTRAOPERATIVE RADIATION THERAPY ([facility])  2012   • KNEE SURGERY Bilateral 2016   • MOHS RECONSTRUCTION N/A 4/7/2022    Procedure: NASAL WOUND RECONSTRUCTION MOHS DEFECT; LOCAL FLAP;  Surgeon: Bora Patel Siomara Santos MD;  Location: [facility];  Service: Plastics   • [facility] BIOPSY  2011       Social History

## 2022-11-07 DIAGNOSIS — N32.81 OAB (OVERACTIVE BLADDER): ICD-10-CM

## 2022-11-07 RX ORDER — TROSPIUM CHLORIDE 20 MG/1
TABLET, FILM COATED ORAL
Qty: 60 TABLET | Refills: 3 | Status: SHIPPED | OUTPATIENT
Start: 2022-11-07

## 2023-10-23 DIAGNOSIS — N32.81 OAB (OVERACTIVE BLADDER): ICD-10-CM

## 2023-10-23 RX ORDER — TROSPIUM CHLORIDE 20 MG/1
20 TABLET, FILM COATED ORAL 2 TIMES DAILY
Qty: 60 TABLET | Refills: 12 | Status: SHIPPED | OUTPATIENT
Start: 2023-10-23

## 2023-10-23 NOTE — TELEPHONE ENCOUNTER
Reason for call:   [x] Refill   [] Prior Auth  [] Other:     Office:   [] PCP/Provider -   [x] Specialty/Provider - Urology    Medication: Trospium Chloride 20 mg #60    Pharmacy: [address] [place] [address] #[id number]    Does the patient have enough for 3 days?    [] Yes   [x] No - Send as HP to POD

## 2024-01-17 ENCOUNTER — TELEPHONE (OUTPATIENT)
Age: 84
End: 2024-01-17

## 2024-01-17 DIAGNOSIS — C61 PROSTATE CANCER (HCC): Primary | ICD-10-CM

## 2024-01-17 NOTE — TELEPHONE ENCOUNTER
Patient returned a missed call re: PSA order.    Pt is questioning why he needs to have the PSA done prior to his 1/22 follow up.     Pt states that he usually provides a urine sample during his office visit.     I sent a message via TEAMS to the office and received info that the provider is recommending that he has the PSA testing done.     This was agreed upon during the patient's last year's visit.    Pt verbalized understanding, and plans to go to the lab on [address] before the end of the week.

## 2024-01-18 ENCOUNTER — APPOINTMENT (OUTPATIENT)
Dept: LAB | Facility: MEDICAL CENTER | Age: 84
End: 2024-01-18
Payer: MEDICARE

## 2024-01-18 DIAGNOSIS — C61 PROSTATE CANCER (HCC): ICD-10-CM

## 2024-01-18 LAB — PSA SERPL-MCNC: 0.2 NG/ML (ref 0–4)

## 2024-01-18 PROCEDURE — 36415 COLL VENOUS BLD VENIPUNCTURE: CPT

## 2024-01-18 PROCEDURE — 84153 ASSAY OF PSA TOTAL: CPT

## 2024-09-12 ENCOUNTER — OFFICE VISIT (OUTPATIENT)
Dept: UROLOGY | Facility: MEDICAL CENTER | Age: 84
End: 2024-09-12
Payer: MEDICARE

## 2024-09-12 VITALS
WEIGHT: 229.8 LBS | OXYGEN SATURATION: 97 % | HEIGHT: 69 IN | BODY MASS INDEX: 34.04 KG/M2 | SYSTOLIC BLOOD PRESSURE: 118 MMHG | DIASTOLIC BLOOD PRESSURE: 78 MMHG | HEART RATE: 63 BPM

## 2024-09-12 DIAGNOSIS — C61 PROSTATE CANCER (HCC): ICD-10-CM

## 2024-09-12 DIAGNOSIS — N40.1 BENIGN PROSTATIC HYPERPLASIA (BPH) WITH STRAINING ON URINATION: Primary | ICD-10-CM

## 2024-09-12 DIAGNOSIS — R39.16 BENIGN PROSTATIC HYPERPLASIA (BPH) WITH STRAINING ON URINATION: Primary | ICD-10-CM

## 2024-09-12 DIAGNOSIS — N32.81 OVERACTIVE BLADDER: ICD-10-CM

## 2024-09-12 PROCEDURE — 99213 OFFICE O/P EST LOW 20 MIN: CPT

## 2024-09-12 RX ORDER — TAMSULOSIN HYDROCHLORIDE 0.4 MG/1
0.4 CAPSULE ORAL
Qty: 30 CAPSULE | Refills: 1 | Status: SHIPPED | OUTPATIENT
Start: 2024-09-12

## 2024-09-12 RX ORDER — FLUOROURACIL 50 MG/G
CREAM TOPICAL
COMMUNITY
Start: 2024-06-19

## 2024-09-12 RX ORDER — AMLODIPINE BESYLATE 5 MG/1
5 TABLET ORAL DAILY
COMMUNITY
Start: 2024-07-12

## 2024-09-12 RX ORDER — TRAMADOL HYDROCHLORIDE 50 MG/1
TABLET ORAL
COMMUNITY
Start: 2024-08-21

## 2024-09-12 RX ORDER — FUROSEMIDE 20 MG
20 TABLET ORAL
COMMUNITY
Start: 2023-12-07

## 2024-09-12 NOTE — ASSESSMENT & PLAN NOTE
Patient has previously trialed oxybutynin, Vesicare, and Myrbetriq for treatment of his overactive bladder symptoms without adequate relief.  Patient is currently on trospium chloride twice daily, but notes no difference in his voiding pattern on this medication.  We discussed more invasive options including Botox injections into the bladder, PTNS, and neuromodulation with a bladder stim.  However, the patient defers any of these more invasive options at this time.  The patient does note that he will consider the Botox injections and discuss it at his next office visit.  The patient notes that he would like to discontinue the use of his trospium chloride as he has noticed no change while on the medication.

## 2024-09-12 NOTE — PROGRESS NOTES
9/12/2024      Assessment and Plan    83 y.o. male managed by Dr. Paul    Prostate cancer ([facility])  Remote history of prostate cancer status post radiation therapy  Patient's most recent PSA was performed 1/18/2024 and found to be 0.20.  Refer to PSA trend below.  We discussed that the patient will be due for a repeat PSA in the next 3 to 4 months as this will be a year from his last.  Additionally, we discussed that the patient will need to undergo a DIOGO at that time for his annual prostate cancer screening visit.  Patient will return to the office in 3 months with a PSA prior and a DIOGO at that time.    PSA Trend:  0.20 - 1/18/24  0.20 - 3/9/22  0.17 - 2/26/21  0.15 - 1/31/20  0.22 - 1/31/19    Overactive bladder  Patient has previously trialed oxybutynin, Vesicare, and Myrbetriq for treatment of his overactive bladder symptoms without adequate relief.  Patient is currently on trospium chloride twice daily, but notes no difference in his voiding pattern on this medication.  We discussed more invasive options including Botox injections into the bladder, PTNS, and neuromodulation with a bladder stim.  However, the patient defers any of these more invasive options at this time.  The patient does note that he will consider the Botox injections and discuss it at his next office visit.  The patient notes that he would like to discontinue the use of his trospium chloride as he has noticed no change while on the medication.    Benign prostatic hyperplasia (BPH) with straining on urination  AUA symptom score 25  Patient reported today that he has been experiencing straining to initiate urination and postvoid dribbling.  We discussed trialing an alpha-blocker such as Flomax or Rapaflo.  The patient will trial Flomax 0.4 mg once daily and follow-up in the office in 3 months for symptom reassessment and PVR at that time.        History of Present Illness  Zak Verma is a 83 y.o. male here for evaluation of overactive  bladder and a remote history of prostate cancer status post radiation therapy.  Patient's overactive bladder symptoms are currently controlled on trospium chloride twice per day.  Patient has previously trialed oxybutynin, Vesicare, and Myrbetriq without adequate relief.  Patient has previously noted nocturia 3-4 times and frequent urination every 2-3 hours during the day.  Patient's most recent PSA was performed 1/18/2024 and found to be 0.20.  Prior to that patient's last PSA was performed in 2022 and also 0.2 at that time and appears to be remaining stable.  AUA symptom score 25.  Today, the patient reports that he is unhappy with his current voiding pattern on trospium chloride twice per day.  Patient states that he feels as if his voiding pattern has not been changed much while on the medication.  Patient noted that he would like to discontinue the use of the medication to see his voiding pattern and overactive bladder symptoms worsen off of the medication.  Additionally, the patient notes increased difficulty initiating his urinary stream and postvoid dribbling.  Otherwise, the patient notes no feelings of incomplete bladder emptying, worsening urinary frequency/urgency, dysuria, hematuria, or flank pain.        Review of Systems   Constitutional:  Negative for chills and fever.   HENT:  Negative for ear pain and sore throat.    Eyes:  Negative for pain and visual disturbance.   Respiratory:  Negative for cough and shortness of breath.    Cardiovascular:  Negative for chest pain and palpitations.   Gastrointestinal:  Negative for abdominal pain and vomiting.   Genitourinary:  Positive for difficulty urinating, frequency and urgency. Negative for decreased urine volume, dysuria, flank pain and hematuria.   Musculoskeletal:  Negative for arthralgias and back pain.   Skin:  Negative for color change and rash.   Neurological:  Negative for seizures and syncope.   All other systems reviewed and are  "negative.          AUA SYMPTOM SCORE      Flowsheet Row Most Recent Value   AUA SYMPTOM SCORE    How often have you had a sensation of not emptying your bladder completely after you finished urinating? 2 (P)     How often have you had to urinate again less than two hours after you finished urinating? 3 (P)     How often have you found you stopped and started again several times when you urinate? 5 (P)     How often have you found it difficult to postpone urination? 4 (P)     How often have you had a weak urinary stream? 4 (P)     How often have you had to push or strain to begin urination? 3 (P)     How many times did you most typically get up to urinate from the time you went to bed at night until the time you got up in the morning? 4 (P)     Quality of Life: If you were to spend the rest of your life with your urinary condition just the way it is now, how would you feel about that? 3 (P)     AUA SYMPTOM SCORE 25 (P)               Vitals  Vitals:    09/12/24 1530   BP: 118/78   BP Location: Left arm   Patient Position: Sitting   Cuff Size: Large   Pulse: 63   SpO2: 97%   Weight: 104 kg (229 lb 12.8 oz)   Height: 5' 9\" (1.753 m)       Physical Exam  Vitals reviewed.   Constitutional:       General: He is not in acute distress.     Appearance: Normal appearance. He is not ill-appearing.   HENT:      Head: Normocephalic and atraumatic.      Nose: Nose normal.   Eyes:      General: No scleral icterus.  Pulmonary:      Effort: No respiratory distress.   Abdominal:      General: Abdomen is flat. There is no distension.      Palpations: Abdomen is soft.      Tenderness: There is no abdominal tenderness.   Musculoskeletal:         General: Normal range of motion.      Cervical back: Normal range of motion.   Skin:     General: Skin is warm.      Coloration: Skin is not jaundiced.   Neurological:      Mental Status: He is alert and oriented to person, place, and time.      Gait: Gait normal.   Psychiatric:         Mood and " Affect: Mood normal.         Behavior: Behavior normal.           Past History  Past Medical History:   Diagnosis Date    Anxiety state     Atrial fib/flutter, transient (HCC) 2024    Depression     Diabetes mellitus, type 2 (HCC)     Erectile dysfunction     Gout     Hard to intubate     Hemorrhoids, external     Hypertension     Malignant neoplasm prostate (HCC)     Nocturia     Skin cancer     Weak urinary stream      Social History     Socioeconomic History    Marital status:      Spouse name: None    Number of children: None    Years of education: None    Highest education level: None   Occupational History    None   Tobacco Use    Smoking status: Former     Current packs/day: 0.00     Types: Cigarettes     Quit date: 1962     Years since quittin.7    Smokeless tobacco: Never   Vaping Use    Vaping status: Never Used   Substance and Sexual Activity    Alcohol use: Yes     Comment: 3 a week    Drug use: No    Sexual activity: None   Other Topics Concern    None   Social History Narrative    None     Social Determinants of Health     Financial Resource Strain: Not on file   Food Insecurity: Not on file   Transportation Needs: Not on file   Physical Activity: Not on file   Stress: Not on file   Social Connections: Not on file   Intimate Partner Violence: Not on file   Housing Stability: Not on file     Social History     Tobacco Use   Smoking Status Former    Current packs/day: 0.00    Types: Cigarettes    Quit date: 1962    Years since quittin.7   Smokeless Tobacco Never     Family History   Family history unknown: Yes       The following portions of the patient's history were reviewed and updated as appropriate: allergies, current medications, past medical history, past social history, past surgical history and problem list.    Results  No results found for this or any previous visit (from the past 1 hour(s)).]  Lab Results   Component Value Date    PSA 0.20 2024    PSA 0.2  03/09/2022     Lab Results   Component Value Date    CALCIUM 9.6 06/25/2024    K 3.9 06/25/2024    CO2 28 06/25/2024     06/25/2024    BUN 14 06/25/2024    CREATININE 0.97 06/25/2024     Lab Results   Component Value Date    WBC 9.67 03/23/2022    HGB 11.7 (L) 03/23/2022    HCT 34.3 (L) 03/23/2022    MCV 97 03/23/2022     (L) 03/23/2022

## 2024-09-12 NOTE — ASSESSMENT & PLAN NOTE
AUA symptom score 25  Patient reported today that he has been experiencing straining to initiate urination and postvoid dribbling.  We discussed trialing an alpha-blocker such as Flomax or Rapaflo.  The patient will trial Flomax 0.4 mg once daily and follow-up in the office in 3 months for symptom reassessment and PVR at that time.

## 2024-09-12 NOTE — ASSESSMENT & PLAN NOTE
Remote history of prostate cancer status post radiation therapy  Patient's most recent PSA was performed 1/18/2024 and found to be 0.20.  Refer to PSA trend below.  We discussed that the patient will be due for a repeat PSA in the next 3 to 4 months as this will be a year from his last.  Additionally, we discussed that the patient will need to undergo a DIOGO at that time for his annual prostate cancer screening visit.  Patient will return to the office in 3 months with a PSA prior and a DIOGO at that time.    PSA Trend:  0.20 - 1/18/24  0.20 - 3/9/22  0.17 - 2/26/21  0.15 - 1/31/20  0.22 - 1/31/19

## 2024-10-19 DIAGNOSIS — N40.1 BENIGN PROSTATIC HYPERPLASIA (BPH) WITH STRAINING ON URINATION: ICD-10-CM

## 2024-10-19 DIAGNOSIS — R39.16 BENIGN PROSTATIC HYPERPLASIA (BPH) WITH STRAINING ON URINATION: ICD-10-CM

## 2024-10-21 RX ORDER — TAMSULOSIN HYDROCHLORIDE 0.4 MG/1
0.4 CAPSULE ORAL
Qty: 30 CAPSULE | Refills: 5 | Status: SHIPPED | OUTPATIENT
Start: 2024-10-21

## 2025-01-07 ENCOUNTER — APPOINTMENT (OUTPATIENT)
Dept: LAB | Facility: MEDICAL CENTER | Age: 85
End: 2025-01-07
Payer: MEDICARE

## 2025-01-07 DIAGNOSIS — C61 PROSTATE CANCER (HCC): ICD-10-CM

## 2025-01-07 LAB — PSA SERPL-MCNC: 0.14 NG/ML (ref 0–4)

## 2025-01-07 PROCEDURE — 84153 ASSAY OF PSA TOTAL: CPT

## 2025-01-07 PROCEDURE — 36415 COLL VENOUS BLD VENIPUNCTURE: CPT

## 2025-01-17 ENCOUNTER — OFFICE VISIT (OUTPATIENT)
Dept: UROLOGY | Facility: MEDICAL CENTER | Age: 85
End: 2025-01-17
Payer: MEDICARE

## 2025-01-17 VITALS
SYSTOLIC BLOOD PRESSURE: 138 MMHG | WEIGHT: 230 LBS | BODY MASS INDEX: 34.07 KG/M2 | OXYGEN SATURATION: 97 % | DIASTOLIC BLOOD PRESSURE: 80 MMHG | HEIGHT: 69 IN | HEART RATE: 93 BPM

## 2025-01-17 DIAGNOSIS — N40.1 BENIGN PROSTATIC HYPERPLASIA (BPH) WITH STRAINING ON URINATION: Primary | ICD-10-CM

## 2025-01-17 DIAGNOSIS — R39.16 BENIGN PROSTATIC HYPERPLASIA (BPH) WITH STRAINING ON URINATION: Primary | ICD-10-CM

## 2025-01-17 DIAGNOSIS — N32.81 OVERACTIVE BLADDER: ICD-10-CM

## 2025-01-17 DIAGNOSIS — N32.81 OAB (OVERACTIVE BLADDER): ICD-10-CM

## 2025-01-17 DIAGNOSIS — C61 PROSTATE CANCER (HCC): ICD-10-CM

## 2025-01-17 LAB — POST-VOID RESIDUAL VOLUME, ML POC: 11 ML

## 2025-01-17 PROCEDURE — 99213 OFFICE O/P EST LOW 20 MIN: CPT

## 2025-01-17 PROCEDURE — 51798 US URINE CAPACITY MEASURE: CPT

## 2025-01-17 RX ORDER — TROSPIUM CHLORIDE 20 MG/1
TABLET, FILM COATED ORAL
COMMUNITY

## 2025-01-17 NOTE — ASSESSMENT & PLAN NOTE
Patient has previously trialed trospium chloride, oxybutynin, Vesicare, and Myrbetriq for treatment of his overactive bladder symptoms without adequate relief.  Patient is currently controlled on Flomax 0.4 mg once daily for treatment of his lower urinary tract symptoms.  However, the patient notices no further benefit to his voiding pattern on this medication.  We discussed options moving forward including more invasive options such as Botox injections into the bladder, PTNS, and neuromodulation with a bladder stimulator.  However, the patient defers any of these options at this time.  Patient also defers further pharmacotherapy.  Patient will discontinue use of the Flomax 0.4 mg and treat his urinary frequency with conservative measures.  Our office will continue to monitor for worsening/progression of lower urinary tract symptoms

## 2025-01-17 NOTE — PROGRESS NOTES
1/17/2025      Assessment and Plan    84 y.o. male managed by Dr. Paul    Prostate cancer ([facility])  Remote history of prostate cancer status post radiation therapy  Patient's most recent PSA was performed 1/7/2025 and found to be stable at a value of 0.139.  Refer to PSA trend below.  We discussed that the patient can repeat his PSA in 1 year from his last with follow-up in the office at that time.    PSA Trend:  0.139 - 1/7/25  0.20 - 1/18/24  0.20 - 3/9/22  0.17 - 2/26/21  0.15 - 1/31/20  0.22 - 1/31/19    Overactive bladder  Patient has previously trialed trospium chloride, oxybutynin, Vesicare, and Myrbetriq for treatment of his overactive bladder symptoms without adequate relief.  Patient is currently controlled on Flomax 0.4 mg once daily for treatment of his lower urinary tract symptoms.  However, the patient notices no further benefit to his voiding pattern on this medication.  We discussed options moving forward including more invasive options such as Botox injections into the bladder, PTNS, and neuromodulation with a bladder stimulator.  However, the patient defers any of these options at this time.  Patient also defers further pharmacotherapy.  Patient will discontinue use of the Flomax 0.4 mg and treat his urinary frequency with conservative measures.  Our office will continue to monitor for worsening/progression of lower urinary tract symptoms        History of Present Illness  Zak Verma is a 84 y.o. male here for evaluation of overactive bladder and a remote history of prostate cancer status post radiation therapy.  Patient has previously trialed oxybutynin, Vesicare, trospium chloride, and Myrbetriq without adequate relief.  Patient has previously noted nocturia 3-4 times and frequent urination every 2-3 hours during the day.  Patient's last PSA was performed 1/7/25 and found to be stable at a value of 0.139.  At our last office visit, the patient was initiated on Flomax 0.4 mg daily for  treatment of his lower urinary tract symptoms.  Today, the patient returns and states that there has been no change in his voiding pattern since initiating pharmacotherapy.  Patient continues to urinate 3-4 times at night and about every 2-3 hours during the day.  Patient would like to discontinue the use of the Flomax as he has noticed no significant benefit on the medication.  Patient denies a weakened urinary stream, and intermittent urinary stream, worsening urinary urgency, or urinary incontinence.  AUA symptom score 13.        Review of Systems   Constitutional:  Negative for chills and fever.   HENT:  Negative for ear pain and sore throat.    Eyes:  Negative for pain and visual disturbance.   Respiratory:  Negative for cough and shortness of breath.    Cardiovascular:  Negative for chest pain and palpitations.   Gastrointestinal:  Negative for abdominal pain and vomiting.   Genitourinary:  Positive for frequency. Negative for decreased urine volume, difficulty urinating, dysuria, flank pain, hematuria and urgency.   Musculoskeletal:  Negative for arthralgias and back pain.   Skin:  Negative for color change and rash.   Neurological:  Negative for seizures and syncope.   All other systems reviewed and are negative.          AUA SYMPTOM SCORE      Flowsheet Row Most Recent Value   AUA SYMPTOM SCORE    How often have you had a sensation of not emptying your bladder completely after you finished urinating? 0 (P)     How often have you had to urinate again less than two hours after you finished urinating? 1 (P)     How often have you found you stopped and started again several times when you urinate? 3 (P)     How often have you found it difficult to postpone urination? 3 (P)     How often have you had a weak urinary stream? 1 (P)     How often have you had to push or strain to begin urination? 1 (P)     How many times did you most typically get up to urinate from the time you went to bed at night until the time  "you got up in the morning? 4 (P)     Quality of Life: If you were to spend the rest of your life with your urinary condition just the way it is now, how would you feel about that? 3 (P)     AUA SYMPTOM SCORE 13 (P)               Vitals  Vitals:    01/17/25 1422   BP: 138/80   BP Location: Left arm   Patient Position: Sitting   Cuff Size: Standard   Pulse: 93   SpO2: 97%   Weight: 104 kg (230 lb)   Height: 5' 9\" (1.753 m)       Physical Exam  Vitals reviewed.   Constitutional:       General: He is not in acute distress.     Appearance: Normal appearance. He is not ill-appearing.   HENT:      Head: Normocephalic and atraumatic.      Nose: Nose normal.   Eyes:      General: No scleral icterus.  Pulmonary:      Effort: No respiratory distress.   Abdominal:      General: Abdomen is flat. There is no distension.      Palpations: Abdomen is soft.      Tenderness: There is no abdominal tenderness.   Musculoskeletal:         General: Normal range of motion.      Cervical back: Normal range of motion.   Skin:     General: Skin is warm.      Coloration: Skin is not jaundiced.   Neurological:      Mental Status: He is alert and oriented to person, place, and time.      Gait: Gait normal.   Psychiatric:         Mood and Affect: Mood normal.         Behavior: Behavior normal.           Past History  Past Medical History:   Diagnosis Date    Anxiety state     Atrial fib/flutter, transient (HCC) 03/2024    Depression     Diabetes mellitus, type 2 (HCC)     Erectile dysfunction     Gout     Hard to intubate     Hemorrhoids, external     Hypertension     Malignant neoplasm prostate (HCC)     Nocturia     Skin cancer     Weak urinary stream      Social History     Socioeconomic History    Marital status:      Spouse name: None    Number of children: None    Years of education: None    Highest education level: None   Occupational History    None   Tobacco Use    Smoking status: Former     Current packs/day: 0.00     Types: " Cigarettes     Quit date: 1962     Years since quittin.1    Smokeless tobacco: Never   Vaping Use    Vaping status: Never Used   Substance and Sexual Activity    Alcohol use: Yes     Comment: 3 a week    Drug use: No    Sexual activity: None   Other Topics Concern    None   Social History Narrative    None     Social Drivers of Health     Financial Resource Strain: Not on file   Food Insecurity: Not on file   Transportation Needs: Not on file   Physical Activity: Not on file   Stress: Not on file   Social Connections: Not on file   Intimate Partner Violence: Not on file   Housing Stability: Not on file     Social History     Tobacco Use   Smoking Status Former    Current packs/day: 0.00    Types: Cigarettes    Quit date: 1962    Years since quittin.1   Smokeless Tobacco Never     Family History   Family history unknown: Yes       The following portions of the patient's history were reviewed and updated as appropriate: allergies, current medications, past medical history, past social history, past surgical history and problem list.    Results  No results found for this or any previous visit (from the past hour).  ]  Lab Results   Component Value Date    PSA 0.139 2025    PSA 0.20 2024    PSA 0.2 2022     Lab Results   Component Value Date    CALCIUM 10.0 2025    K 4.0 2025    CO2 33 (H) 2025    CL 98 (L) 2025    BUN 18 2025    CREATININE 1.01 2025     Lab Results   Component Value Date    WBC 9.67 2022    HGB 11.7 (L) 2022    HCT 34.3 (L) 2022    MCV 97 2022     (L) 2022

## 2025-01-17 NOTE — ASSESSMENT & PLAN NOTE
Remote history of prostate cancer status post radiation therapy  Patient's most recent PSA was performed 1/7/2025 and found to be stable at a value of 0.139.  Refer to PSA trend below.  We discussed that the patient can repeat his PSA in 1 year from his last with follow-up in the office at that time.    PSA Trend:  0.139 - 1/7/25  0.20 - 1/18/24  0.20 - 3/9/22  0.17 - 2/26/21  0.15 - 1/31/20  0.22 - 1/31/19

## (undated) DEVICE — DRAPE SHEET THREE QUARTER

## (undated) DEVICE — 2000CC GUARDIAN II: Brand: GUARDIAN

## (undated) DEVICE — SCD SEQUENTIAL COMPRESSION COMFORT SLEEVE MEDIUM KNEE LENGTH: Brand: KENDALL SCD

## (undated) DEVICE — 10FR FRAZIER SUCTION HANDLE: Brand: CARDINAL HEALTH

## (undated) DEVICE — INTENDED FOR TISSUE SEPARATION, AND OTHER PROCEDURES THAT REQUIRE A SHARP SURGICAL BLADE TO PUNCTURE OR CUT.: Brand: BARD-PARKER ® CARBON RIB-BACK BLADES

## (undated) DEVICE — PENCIL ELECTROSURG E-Z CLEAN -0035H

## (undated) DEVICE — GLOVE SRG BIOGEL 7.5

## (undated) DEVICE — DRAPE EQUIPMENT RF WAND

## (undated) DEVICE — SUT SILK 5-0 P-3 18 IN 640G

## (undated) DEVICE — SKIN MARKER DUAL TIP WITH RULER CAP, FLEXIBLE RULER AND LABELS: Brand: DEVON

## (undated) DEVICE — SPONGE STICK WITH PVP-I: Brand: KENDALL

## (undated) DEVICE — TIBURON SPLIT SHEET: Brand: CONVERTORS

## (undated) DEVICE — TUBING SUCTION 5MM X 12 FT

## (undated) DEVICE — ADHESIVE SKIN HIGH VISCOSITY EXOFIN 1ML

## (undated) DEVICE — GLOVE INDICATOR PI UNDERGLOVE SZ 7.5 BLUE

## (undated) DEVICE — BETHLEHEM UNIVERSAL MINOR GEN: Brand: CARDINAL HEALTH

## (undated) DEVICE — NEEDLE 25G X 1 1/2